# Patient Record
Sex: FEMALE | Race: WHITE | NOT HISPANIC OR LATINO | Employment: FULL TIME | URBAN - METROPOLITAN AREA
[De-identification: names, ages, dates, MRNs, and addresses within clinical notes are randomized per-mention and may not be internally consistent; named-entity substitution may affect disease eponyms.]

---

## 2017-09-28 ENCOUNTER — GENERIC CONVERSION - ENCOUNTER (OUTPATIENT)
Dept: OTHER | Facility: OTHER | Age: 62
End: 2017-09-28

## 2017-09-28 DIAGNOSIS — E78.5 HYPERLIPIDEMIA: ICD-10-CM

## 2017-09-28 DIAGNOSIS — M81.0 AGE-RELATED OSTEOPOROSIS WITHOUT CURRENT PATHOLOGICAL FRACTURE: ICD-10-CM

## 2017-09-28 DIAGNOSIS — E55.9 VITAMIN D DEFICIENCY: ICD-10-CM

## 2017-09-28 DIAGNOSIS — D72.819 DECREASED WHITE BLOOD CELL COUNT: ICD-10-CM

## 2017-09-28 DIAGNOSIS — E03.9 HYPOTHYROIDISM: ICD-10-CM

## 2017-10-05 ENCOUNTER — LAB CONVERSION - ENCOUNTER (OUTPATIENT)
Dept: OTHER | Facility: OTHER | Age: 62
End: 2017-10-05

## 2017-10-05 ENCOUNTER — GENERIC CONVERSION - ENCOUNTER (OUTPATIENT)
Dept: OTHER | Facility: OTHER | Age: 62
End: 2017-10-05

## 2017-10-05 LAB
25(OH)D3 SERPL-MCNC: 32 NG/ML (ref 30–100)
A/G RATIO (HISTORICAL): 1.8 (CALC) (ref 1–2.5)
ALBUMIN SERPL BCP-MCNC: 4.6 G/DL (ref 3.6–5.1)
ALP SERPL-CCNC: 94 U/L (ref 33–130)
ALT SERPL W P-5'-P-CCNC: 26 U/L (ref 6–29)
AST SERPL W P-5'-P-CCNC: 36 U/L (ref 10–35)
BILIRUB SERPL-MCNC: 0.6 MG/DL (ref 0.2–1.2)
BUN SERPL-MCNC: 16 MG/DL (ref 7–25)
BUN/CREA RATIO (HISTORICAL): ABNORMAL (CALC) (ref 6–22)
CALCIUM SERPL-MCNC: 9.7 MG/DL (ref 8.6–10.4)
CHLORIDE SERPL-SCNC: 105 MMOL/L (ref 98–110)
CHOLEST SERPL-MCNC: 223 MG/DL
CHOLEST/HDLC SERPL: 2.4 (CALC)
CO2 SERPL-SCNC: 28 MMOL/L (ref 20–31)
CREAT SERPL-MCNC: 0.89 MG/DL (ref 0.5–0.99)
DEPRECATED RDW RBC AUTO: 12.4 % (ref 11–15)
EGFR AFRICAN AMERICAN (HISTORICAL): 81 ML/MIN/1.73M2
EGFR-AMERICAN CALC (HISTORICAL): 70 ML/MIN/1.73M2
GAMMA GLOBULIN (HISTORICAL): 2.5 G/DL (CALC) (ref 1.9–3.7)
GLUCOSE (HISTORICAL): 95 MG/DL (ref 65–99)
HCT VFR BLD AUTO: 43 % (ref 35–45)
HDLC SERPL-MCNC: 92 MG/DL
HGB BLD-MCNC: 14.3 G/DL (ref 11.7–15.5)
LDL CHOLESTEROL (HISTORICAL): 116 MG/DL (CALC)
MCH RBC QN AUTO: 32.5 PG (ref 27–33)
MCHC RBC AUTO-ENTMCNC: 33.3 G/DL (ref 32–36)
MCV RBC AUTO: 97.7 FL (ref 80–100)
NON-HDL-CHOL (CHOL-HDL) (HISTORICAL): 131 MG/DL (CALC)
PLATELET # BLD AUTO: 181 THOUSAND/UL (ref 140–400)
PMV BLD AUTO: 12.8 FL (ref 7.5–12.5)
POTASSIUM SERPL-SCNC: 4.6 MMOL/L (ref 3.5–5.3)
RBC # BLD AUTO: 4.4 MILLION/UL (ref 3.8–5.1)
SODIUM SERPL-SCNC: 141 MMOL/L (ref 135–146)
TOTAL PROTEIN (HISTORICAL): 7.1 G/DL (ref 6.1–8.1)
TRIGL SERPL-MCNC: 54 MG/DL
TSH SERPL DL<=0.05 MIU/L-ACNC: 3.79 MIU/L (ref 0.4–4.5)
WBC # BLD AUTO: 2.8 THOUSAND/UL (ref 3.8–10.8)

## 2017-12-01 ENCOUNTER — ALLSCRIPTS OFFICE VISIT (OUTPATIENT)
Dept: OTHER | Facility: OTHER | Age: 62
End: 2017-12-01

## 2017-12-01 ENCOUNTER — GENERIC CONVERSION - ENCOUNTER (OUTPATIENT)
Dept: OTHER | Facility: OTHER | Age: 62
End: 2017-12-01

## 2017-12-01 DIAGNOSIS — D72.819 DECREASED WHITE BLOOD CELL COUNT: ICD-10-CM

## 2018-01-02 ENCOUNTER — ALLSCRIPTS OFFICE VISIT (OUTPATIENT)
Dept: OTHER | Facility: OTHER | Age: 63
End: 2018-01-02

## 2018-01-03 NOTE — PROGRESS NOTES
Assessment   1  Shingles (053 9) (B02 9)    Plan   Shingles    · Calamine External Lotion; USE AS DIRECTED ON PACKAGE   · ValACYclovir HCl - 1 GM Oral Tablet; TAKE 1 TABLET 3 TIMES DAILY    Discussion/Summary      62F with herpes zoster exacerbation likely 2/2 recent stress from divorce  Only took two dose of Valtrex  attached picture of rsh 1gm TID x 1 week lotion PRN currently having neuropathic pain, but if she does develop, can give Neurontin if not improved in 1 week  Possible side effects of new medications were reviewed with the patient/guardian today  The treatment plan was reviewed with the patient/guardian  The patient/guardian understands and agrees with the treatment plan      Chief Complaint   1  Rash    History of Present Illness   HPI: Pt noticed a rash almost a week ago on her back, but couldn't come in to be seen right away  She had been jogging a lot and noticed an itch under her sports bra and then got worse a few days ago  So, she took Valtrex that she had leftover from 1 5 years ago for a cold sore, only took 2 pills, she thinks that helped the irritating itch quite a bit  She took that 3 days ago and hasn't taken anything else OTC  itching is a lot more tolerable now  Denies any numbness or tingling   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   is going through a divorce    Rash:      Associated symptoms include pruritus, but-- no pain,-- no skin weeping,-- no fever,-- no nausea,-- no purulent drainage-- and-- no serous discharge  Review of Systems        Constitutional: no fever-- and-- no chills  Gastrointestinal: no abdominal pain,-- no nausea-- and-- no diarrhea  Musculoskeletal: no arthralgias-- and-- no myalgias  Integumentary: rash, but-- as noted in HPI-- and-- no skin lesions  Neurological: no numbness-- and-- no tingling  Active Problems   1  Hyperlipidemia (272 4) (E78 5)   2  Hypothyroidism (244 9) (E03 9)   3  Leukopenia (288 50) (D74 819)   4  Osteoporosis (733 00) (M81 0)   5  Vitamin D deficiency (268 9) (E55 9)    Past Medical History   1  History of Herpes simplex type 1 infection (054 9) (B00 9)   2  History of Squamous Cell Carcinoma Of The Skin (173 92)  Active Problems And Past Medical History Reviewed: The active problems and past medical history were reviewed and updated today  Family History   Mother    1  Family history of Colon Cancer (V16 0)  Father    2  Family history of Coronary Artery Disease (V17 49)   3  Family history of Hypertension (V17 49)   4  Family history of Type 2 Diabetes Mellitus    Social History    · Never A Smoker    Surgical History   1  History of Excision Of Lesion Face Malignant    Current Meds    1  No Reported Medications  Requested for: [id number] Recorded     The medication list was reviewed and updated today  Allergies   1  Penicillins    Vitals    Recorded: [id number] 08:36AM   Temperature 97 1 F   Heart Rate 70   Respiration 16   Systolic 867   Diastolic 75   Height 5 ft 7 in   Weight 113 lb    BMI Calculated 17 7   BSA Calculated 1 59     Physical Exam        Constitutional      General appearance: No acute distress, well appearing and well nourished  Pulmonary      Respiratory effort: No increased work of breathing or signs of respiratory distress  Auscultation of lungs: Clear to auscultation  Cardiovascular      Auscultation of heart: Normal rate and rhythm, normal S1 and S2, without murmurs  Musculoskeletal      Gait and station: Normal        Inspection/palpation of joints, bones, and muscles: Normal        Skin      Skin and subcutaneous tissue: Abnormal  -- (has a widespread area on her mid back that has scattered, erythematous, papules in various stages of healing  Multiple excoriations   No drainage  )         Signatures    Electronically signed by : LUIS EDUARDO Redd ; Jan 2 2018  9:05AM EST                       (Author)

## 2018-01-11 NOTE — RESULT NOTES
Message  Tasha Montague I have enclosed a copy of your recent labs  I will review your tests at your next visit in November  Verified Results  (1) COMPREHENSIVE METABOLIC PANEL [id number] 49:00BZ Dionne Hatch     Test Name Result Flag Reference   GLUCOSE 95 mg/dL  65-99   Fasting reference interval   UREA NITROGEN (BUN) 16 mg/dL  7-25   CREATININE 0 89 mg/dL  0 50-0 99   For patients >52years of age, the reference limit  for Creatinine is approximately 13% higher for people  identified as -American  eGFR NON-AFR  AMERICAN 70 mL/min/1 73m2  > OR = 60   eGFR AFRICAN AMERICAN 81 mL/min/1 73m2  > OR = 60   BUN/CREATININE RATIO   5-56   NOT APPLICABLE (calc)   SODIUM 141 mmol/L  135-146   POTASSIUM 4 6 mmol/L  3 5-5 3   CHLORIDE 105 mmol/L     CARBON DIOXIDE 28 mmol/L  20-31   CALCIUM 9 7 mg/dL  8 6-10 4   PROTEIN, TOTAL 7 1 g/dL  6 1-8 1   ALBUMIN 4 6 g/dL  3 6-5 1   GLOBULIN 2 5 g/dL (calc)  1 9-3 7   ALBUMIN/GLOBULIN RATIO 1 8 (calc)  1 0-2 5   BILIRUBIN, TOTAL 0 6 mg/dL  0 2-1 2   ALKALINE PHOSPHATASE 94 U/L     AST 36 U/L H 10-35   ALT 26 U/L  6-29     (1) LIPID PANEL, FASTING [id number] 08:04AM Dionne Hatch     Test Name Result Flag Reference   CHOLESTEROL, TOTAL 223 mg/dL H <200   HDL CHOLESTEROL 92 mg/dL  >11   TRIGLICERIDES 54 mg/dL  <028   LDL-CHOLESTEROL 116 mg/dL (calc) H    Reference range: <100     Desirable range <100 mg/dL for patients with CHD or  diabetes and <70 mg/dL for diabetic patients with  known heart disease  LDL-C is now calculated using the Magdi-Ochoa   calculation, which is a validated novel method providing   better accuracy than the Friedewald equation in the   estimation of LDL-C  Rk Lebron al  [facility]  3233;425(88): 2760-1042   (http://Sciences-U/faq/SSE486)   CHOL/HDLC RATIO 2 4 (calc)  <5 0   NON HDL CHOLESTEROL 131 mg/dL (calc) H <130   For patients with diabetes plus 1 major ASCVD risk   factor, treating to a non-HDL-C goal of <100 mg/dL   (LDL-C of <70 mg/dL) is considered a therapeutic   option  (Q) CBC (H/H, RBC, INDICES, WBC, PLT) [id number] 08:04ZIGGY Mala [place]     Test Name Result Flag Reference   WHITE BLOOD CELL COUNT 2 8 Thousand/uL L 3 8-10 8   RED BLOOD CELL COUNT 4 40 Million/uL  3 80-5 10   HEMOGLOBIN 14 3 g/dL  11 7-15 5   HEMATOCRIT 43 0 %  35 0-45 0   MCV 97 7 fL  80 0-100 0   MCH 32 5 pg  27 0-33 0   MCHC 33 3 g/dL  32 0-36 0   RDW 12 4 %  11 0-15 0   PLATELET COUNT 933 Thousand/uL  140-400   MPV 12 8 fL H 7 5-12 5     *(Q) VITAMIN D, 25-HYDROXY, LC/MS/MS 04Oct2017 08:04ZIGGY Mala [place]     Test Name Result Flag Reference   VITAMIN D, 25-OH, TOTAL 32 ng/mL     Vitamin D Status         25-OH Vitamin D:     Deficiency:                    <20 ng/mL  Insufficiency:             20 - 29 ng/mL  Optimal:                 > or = 30 ng/mL     For 25-OH Vitamin D testing on patients on   D2-supplementation and patients for whom quantitation   of D2 and D3 fractions is required, the QuestAssureD(TM)  25-OH VIT D, (D2,D3), LC/MS/MS is recommended: order   code 74133 (patients >2yrs)  For more information on this test, go to:  http://AdScoot/faq/MDG745  (This link is being provided for   informational/educational purposes only )     (Q) TSH, 3RD GENERATION W/REFLEX TO FT4 04Oct2017 08:04AM Mala [place]   REPORT COMMENT:  FASTING:YES     Test Name Result Flag Reference   TSH W/REFLEX TO FT4 3 79 mIU/L  0 40-4 50       Signatures   Electronically signed by : LUIS EDUARDO Ellison ; Oct  5 2017  3:36PM EST                       (Author)

## 2018-01-12 NOTE — RESULT NOTES
Message  Yovany Hernandez I have enclosed a copy of your recent labs  mildly elevated cholesterol at 228 < 200 normal  mild decrease in white cell count at 3,100  no change from August 2015  mildly elevated TSH  this indicates a mildly underactive thyroid  please schedule a follow up visit to review  Results/Data     (1) LIPID PANEL, FASTING   CHOLESTEROL, TOTAL: 228 mg/dL Abnormal High Reference Range 125-200  HDL CHOLESTEROL: 90 mg/dL Reference Range > OR = 46  TRIGLICERIDES: 69 mg/dL Reference Range <150  LDL-CHOLESTEROL: 124 mg/dL (calc) Reference Range <130  CHOL/HDLC RATIO: 2 5 (calc) Reference Range < OR = 5 0  NON HDL CHOLESTEROL: 138 mg/dL (calc)  (1) COMPREHENSIVE METABOLIC PANEL   GLUCOSE: 89 mg/dL Reference Range 65-99  UREA NITROGEN (BUN): 13 mg/dL Reference Range 7-25  CREATININE: 0 96 mg/dL Reference Range 0 50-0 99  eGFR NON-AFR   AMERICAN: 64 mL/min/1 73m2 Reference Range > OR = 60  eGFR : 75 mL/min/1 73m2 Reference Range > OR = 60  BUN/CREATININE RATIO: NOT APPLICABLE (calc) Reference Range 6-22  SODIUM: 140 mmol/L Reference Range 135-146  POTASSIUM: 4 6 mmol/L Reference Range 3 5-5 3  CHLORIDE: 104 mmol/L Reference Range   CARBON DIOXIDE: 30 mmol/L Reference Range 20-31  CALCIUM: 9 9 mg/dL Reference Range 8 6-10 4  PROTEIN, TOTAL: 7 2 g/dL Reference Range 6 1-8 1  ALBUMIN: 4 7 g/dL Reference Range 3 6-5 1  GLOBULIN: 2 5 g/dL (calc) Reference Range 1 9-3 7  ALBUMIN/GLOBULIN RATIO: 1 9 (calc) Reference Range 1 0-2 5  BILIRUBIN, TOTAL: 0 7 mg/dL Reference Range 0 2-1 2  ALKALINE PHOSPHATASE: 83 U/L Reference Range   AST: 22 U/L Reference Range 10-35  ALT: 18 U/L Reference Range 6-29  (1) CBC/PLT/DIFF   WHITE BLOOD CELL COUNT: 3 1 Thousand/uL Abnormal Low Reference Range  3 8-10 8  RED BLOOD CELL COUNT: 4 47 Million/uL Reference Range 3 80-5 10  HEMOGLOBIN: 14 8 g/dL Reference Range 11 7-15 5  HEMATOCRIT: 43 7 % Reference Range 35 0-45 0  MCV: 97 8 fL Reference Range 80 0-100 0  MCH: 33 0 pg Reference Range 27 0-33 0  MCHC: 33 7 g/dL Reference Range 32 0-36 0  RDW: 13 3 % Reference Range 11 0-15 0  PLATELET COUNT: 037 Thousand/uL Reference Range 140-400  MPV: 11 2 fL Reference Range 7 5-11 5  ABSOLUTE NEUTROPHILS: 1373 cells/uL Abnormal Low Reference Range 2109-4880  ABSOLUTE LYMPHOCYTES: 1429 cells/uL Reference Range 850-3900  ABSOLUTE MONOCYTES: 236 cells/uL Reference Range 200-950  ABSOLUTE EOSINOPHILS: 34 cells/uL Reference Range   ABSOLUTE BASOPHILS: 28 cells/uL Reference Range 0-200  NEUTROPHILS: 44 3 %  LYMPHOCYTES: 46 1 %  MONOCYTES: 7 6 %  EOSINOPHILS: 1 1 %  BASOPHILS: 0 9 %  (Q) TSH, 3RD GENERATION   TSH: 5 35 mIU/L Abnormal High Reference Range 0 40-4 50  *(Q) VITAMIN D, 25-HYDROXY, LC/MS/MS   VITAMIN D, 25-OH, TOTAL: 49 ng/mL Reference Range     Signatures   Electronically signed by : LUIS EDUARDO Varner ; Aug 17 2016 12:53PM EST                       (Author)

## 2018-01-14 NOTE — RESULT NOTES
Message  Yovany Hernandez I have enclosed a copy of your recent labs  repeat thyroid studies normal  your white count remains low normal at 3,800 normal range 3,100 to 10,080  Your last 2 white cell counts were 3,100  Tests for folic acid and vitamin B 12 normal  I would recommend a repeat CBC with white cell count one year        Results/Data     (1) CBC/PLT/DIFF   WHITE BLOOD CELL COUNT: 3 4 Thousand/uL Abnormal Low Reference Range  3 8-10 8  RED BLOOD CELL COUNT: 4 07 Million/uL Reference Range 3 80-5 10  HEMOGLOBIN: 13 1 g/dL Reference Range 11 7-15 5  HEMATOCRIT: 38 8 % Reference Range 35 0-45 0  MCV: 95 4 fL Reference Range 80 0-100 0  MCH: 32 2 pg Reference Range 27 0-33 0  MCHC: 33 8 g/dL Reference Range 32 0-36 0  RDW: 13 4 % Reference Range 11 0-15 0  PLATELET COUNT: 787 Thousand/uL Reference Range 140-400  MPV: 11 1 fL Reference Range 7 5-11 5  ABSOLUTE NEUTROPHILS: 2026 cells/uL Reference Range 4232-0019  ABSOLUTE LYMPHOCYTES: 1091 cells/uL Reference Range 850-3900  ABSOLUTE MONOCYTES: 241 cells/uL Reference Range 200-950  ABSOLUTE EOSINOPHILS: 17 cells/uL Reference Range   ABSOLUTE BASOPHILS: 24 cells/uL Reference Range 0-200  NEUTROPHILS: 59 6 %  LYMPHOCYTES: 32 1 %  MONOCYTES: 7 1 %  EOSINOPHILS: 0 5 %  BASOPHILS: 0 7 %  (Q) THYROID PEROXIDASE ANTIBODIES   THYROID PEROXIDASE$ANTIBODIES: 1 IU/mL Reference Range <9  (1) FOLATE   FOLATE, SERUM: 15 8 ng/mL  (1) VITAMIN B12   VITAMIN B12: 443 pg/mL Reference Range 200-1100  (Q) TSH, 3RD GENERATION W/REFLEX TO FT4   TSH W/REFLEX TO FT4: 2 47 mIU/L Reference Range 0 40-4 50    Signatures   Electronically signed by : LUIS EDUARDO Dumont ; Nov 29 2016  4:22PM EST                       (Author)

## 2018-01-18 NOTE — PROGRESS NOTES
Assessment    1  Encounter for preventive health examination (V70 0) (Z00 00)    Plan  Hypothyroidism, Leukopenia    · (1) CBC/PLT/DIFF; Status:Active; Requested for:10Nov2016;    · (1) FOLATE; Status:Active; Requested for:10Nov2016;    · (1) THYROID MICROSOMAL ANTIBODY; Status:Active; Requested for:10Nov2016;    · (1) TSH WITH FT4 REFLEX; Status:Active; Requested for:10Nov2016;    · (1) VITAMIN B12; Status:Active; Requested for:10Nov2016;   Need for shingles vaccine    · Zostavax 62113 UNT/0 65ML Subcutaneous Solution Reconstituted (Zostavax  56933 UNT/0 65ML Subcutaneous Solution Reconstituted); INJECT 0 65 ML Once    Discussion/Summary  health maintenance visit Currently, she eats a healthy diet and has an adequate exercise regimen  cervical cancer screening is current Breast cancer screening: mammogram is current  Colorectal cancer screening: colorectal cancer screening is current  Osteoporosis screening: bone mineral density testing is current  Screening lab work includes glucose  The risks and benefits of immunizations were discussed, immunizations will be given as outlined in the orders and script for shingles  Advice and education were given regarding nutrition, calcium supplements and vitamin D supplements  Repeat CBC and thyroid studies this month  obtain copy of DEXA scan  History of Present Illness  HM, Adult Female: The patient is being seen for a health maintenance evaluation  The last health maintenance visit was >1 year(s) ago  Social History: Household members include spouse  Work status: occupation: retired  The patient has never smoked cigarettes  She reports occasional alcohol use  General Health: The patient's health since the last visit is described as good  She has regular dental visits  She denies vision problems  Lifestyle:  She consumes a diverse and healthy diet  She does not have any weight concerns  She exercises regularly  She exercises 3 or more times per week  Exercise includes walking, aerobic conditioning and strength training  She does not use tobacco    Reproductive health: the patient is postmenopausal    Screening: Cervical cancer screening includes a pap smear performed < 1 year ago  Breast cancer screening includes a mammogram performed 2016  Colorectal cancer screening includes a colonoscopy performed 09/2013  Metabolic screening includes lipid profile performed 08/2016, glucose screening performed 08/2016 and thyroid function test performed 08/2016  General health risks: osteoporosis risk factors  HPI: 64year old female here for wellness exam  last seen 06/2015  she splits her time between [place] and [facility]  her daughter lives in [facility]  active problems and PMH see chart  medications none  OTCs none she stopped taking all supplements  last GYN and mammogram < 1 year  colonoscopy 09/2013  labs 08/2016 see note  Hyperlipidemia with high HDL  Cholesterol 228  Triglycerides 69  HDL 90    Non-  FBS 89  Review of Systems    Constitutional: no recent weight gain and no recent weight loss  Eyes: no eyesight problems  Cardiovascular: no chest pain, no palpitations and no lower extremity edema  Respiratory: no cough, no orthopnea, no wheezing, no shortness of breath during exertion and no PND  Gastrointestinal: no abdominal pain, no nausea, no constipation, no diarrhea and no blood in stools  Genitourinary: no dysuria and no incontinence  Musculoskeletal: no arthralgias and no myalgias  Integumentary: no rashes and no skin lesions  Neurological: no headache and no dizziness  Psychiatric: no anxiety and no depression  Endocrine: history of osteoporosis last DEXA scan < 2 years in [place]  results not available  vitamin D level 08/2016 49  subclinical hypothyroidism  08/2016 TSH 53 90  08/2015  TSH 3 930  05/2012 TSH 4 40  thyroid peroxidase AB negative     Hematologic/Lymphatic: history of mild leukopenia  08/2016 CBC WBC 3,100  Hgb 14  8  platelets 213,386  53/8117 CBC WBC 3,100  Hgb 13  4  platelets 131,214  35/2527 WBC 4,000  Hgb 13  4  platelets 469,349  73/1449 vitamin B 12 1,093    folic acid 20 3  Active Problems    1  Hyperlipidemia (272 4) (E78 5)   2  Hypothyroidism (244 9) (E03 9)   3  Leukopenia (288 50) (D72 819)   4  Osteoporosis (733 00) (M81 0)   5  Vitamin D deficiency (268 9) (E55 9)    Past Medical History    · History of Herpes simplex type 1 infection (054 9) (B00 9)   · History of Squamous Cell Carcinoma Of The Skin (173 92)    Surgical History    · History of Excision Of Lesion Face Malignant    Family History  Mother    · Family history of Colon Cancer (V16 0)  Father    · Family history of Coronary Artery Disease (V17 49)   · Family history of Hypertension (V17 49)   · Family history of Type 2 Diabetes Mellitus    Social History    · Never A Smoker    Current Meds   1  ValACYclovir HCl - 1 GM Oral Tablet; 2 tablets twice a day for one day; Therapy: [id number] to (Last Rx:[id number])  Requested for: 03Tmt8514 Ordered    Allergies    1  Penicillins    Vitals   Recorded: 11HOA6481 88:98AX   Systolic 519   Diastolic 76   Heart Rate 74   Respiration 14   Temperature 97 3 F   Height 5 ft 7 in   Weight 111 lb 0 48 oz   BMI Calculated 17 39   BSA Calculated 1 57     Physical Exam    Constitutional   General appearance: No acute distress, well appearing and well nourished  Eyes   Conjunctiva and lids: No swelling, erythema or discharge  Pupils and irises: Equal, round, reactive to light  Ophthalmoscopic examination: Normal fundi and optic discs  Ears, Nose, Mouth, and Throat   Otoscopic examination: Tympanic membranes translucent with normal light reflex  Canals patent without erythema  Lips, teeth, and gums: Normal, good dentition  Oropharynx: Normal with no erythema, edema, exudate or lesions  Neck   Neck: Supple, symmetric, trachea midline, no masses  Thyroid: Normal, no thyromegaly    There were no thyroid nodules  Pulmonary   Auscultation of lungs: Clear to auscultation  Cardiovascular   Auscultation of heart: Normal rate and rhythm, normal S1 and S2, no murmurs  Heart sounds: no gallop heard  Carotid pulses: 2+ bilaterally  no bruit heard over the right carotid and no bruit heard over the left carotid  Examination of extremities for edema and/or varicosities: Normal     Abdomen   Abdomen: Non-tender, no masses  Liver and spleen: No hepatomegaly or splenomegaly  Lymphatic   Palpation of lymph nodes in neck: No lymphadenopathy  no anterior cervical node enlargement, no posterior cervical node enlargement, no submandibular node enlargement and no supraclavicular node enlargement  Musculoskeletal   Gait and station: Normal     Skin   Skin and subcutaneous tissue: Normal without rashes or lesions  Psychiatric   Mood and affect: Normal        Future Appointments    Date/Time Provider Specialty Site   11/13/2017 08:00 AM LUIS EDUARDO Garcia   Family Medicine [id number]     Signatures   Electronically signed by : LUIS EDUARDO Chaidez ; Nov 10 2016 10:36AM EST                       (Author)

## 2018-01-22 VITALS
SYSTOLIC BLOOD PRESSURE: 108 MMHG | BODY MASS INDEX: 17.11 KG/M2 | HEIGHT: 67 IN | RESPIRATION RATE: 16 BRPM | HEART RATE: 68 BPM | TEMPERATURE: 96.1 F | DIASTOLIC BLOOD PRESSURE: 66 MMHG | WEIGHT: 109 LBS

## 2018-01-22 VITALS
TEMPERATURE: 96.1 F | SYSTOLIC BLOOD PRESSURE: 108 MMHG | HEART RATE: 68 BPM | HEIGHT: 67 IN | WEIGHT: 109 LBS | BODY MASS INDEX: 17.11 KG/M2 | DIASTOLIC BLOOD PRESSURE: 66 MMHG | RESPIRATION RATE: 16 BRPM

## 2018-01-23 VITALS
HEART RATE: 70 BPM | SYSTOLIC BLOOD PRESSURE: 110 MMHG | TEMPERATURE: 97.1 F | RESPIRATION RATE: 16 BRPM | HEIGHT: 67 IN | DIASTOLIC BLOOD PRESSURE: 75 MMHG | WEIGHT: 113 LBS | BODY MASS INDEX: 17.74 KG/M2

## 2018-01-23 NOTE — PROGRESS NOTES
Assessment    1  Encounter for preventive health examination (V70 0) (Z00 00)    Plan  Leukopenia    · (1) CBC/PLT/DIFF; Status:Active; Requested for:[id number];     Discussion/Summary  Breast cancer screening: the next mammogram is due 2017  Regarding leukopenia  I recommended a repeat CBC in 6 month alternatively could consider hematology evaluation  She is due for a follow-up DEXA scan  She is not interested in a flu vaccine  Office visit 1 year  health maintenance visit Currently, she eats a healthy diet and has an adequate exercise regimen  cervical cancer screening is current Breast cancer screening: mammogram is current  Colorectal cancer screening: colorectal cancer screening is current  Osteoporosis screening: bone mineral density testing is current  The risks and benefits of immunizations were discussed and patient declines immunizations  Advice and education were given regarding nutrition, calcium supplements, vitamin D supplements and cardiovascular risk reduction  History of Present Illness  HM, Adult Female: The patient is being seen for a health maintenance evaluation  The last health maintenance visit was 1 year(s) ago  Social History: Household members include spouse  She is   Work status: working part-time and occupation:   The patient has never smoked cigarettes  She reports occasional alcohol use  General Health: The patient's health since the last visit is described as good  She has regular dental visits  She denies vision problems  She denies hearing loss  Lifestyle:  She consumes a diverse and healthy diet  She does not have any weight concerns  She exercises regularly  She exercises 3 or more times per week  Exercise includes walking, aerobic conditioning and strength training  She does not use tobacco    Reproductive health: the patient is postmenopausal    Screening: Cervical cancer screening includes a pap smear performed 2016   Breast cancer screening includes a mammogram performed 2016  Colorectal cancer screening includes a colonoscopy performed 09/2013  Metabolic screening includes lipid profile performed 11/2017, glucose screening performed 11/2017, thyroid function test performed 11/2017 and DEXA performed within the past five years  General health risks: osteoporosis risk factors  HPI: 58year old female here for wellness exam  last seen 11/2016 she splits her time between [place] and [facility]  her daughter lives in [place]  active problems and PMH see chart  medications none  OTCs vitamin D  labs 11/2017 see note  Hyperlipidemia with high HDL  Cholesterol 223  Triglycerides 54  HDL 92    Non   FBS 95  TSH 3 79  Review of Systems    Constitutional: no recent weight gain and no recent weight loss  Eyes: no eyesight problems  ENT: no earache, no sore throat, no nasal discharge and no hoarseness  Cardiovascular: no chest pain, no palpitations and no lower extremity edema  Respiratory: no cough, no orthopnea, no wheezing, no shortness of breath during exertion and no PND  Gastrointestinal: no abdominal pain, no nausea, no constipation, no diarrhea and no blood in stools  Genitourinary: no dysuria and no incontinence  Musculoskeletal: no arthralgias and no myalgias  Integumentary: no rashes and no skin lesions  Neurological: no headache and no dizziness  Psychiatric: no anxiety and no depression  Endocrine: history of osteoporosis last DEXA scan < 2 years in [place]  results not available  vitamin D level 11/2017 32  subclinical hypothyroidism  11/2017 TSH 3 79  08/2016 TSH 53 90  08/2015  TSH 3 930  05/2012 TSH 4 40  thyroid peroxidase AB negative  Hematologic/Lymphatic: history of mild leukopenia  11/2017 CBC WBC 2,800  Hgb 14 3  MCV 97 7  platelets 580,649  32/7039 folate 15  7  vitamin B 12 443  08/2016 CBC WBC 3,100  Hgb 14  8  platelets 838,628  50/6575 CBC WBC 3,100  Hgb 13  4   platelets 168,000  09/2013 WBC 4,000  Hgb 13  4  platelets 470,427  77/5035 vitamin B 12 1,093    folic acid 46 1  Active Problems    1  Hyperlipidemia (272 4) (E78 5)   2  Hypothyroidism (244 9) (E03 9)   3  Leukopenia (288 50) (D72 819)   4  Osteoporosis (733 00) (M81 0)   5  Vitamin D deficiency (268 9) (E55 9)    Past Medical History    · History of Herpes simplex type 1 infection (054 9) (B00 9)   · History of Squamous Cell Carcinoma Of The Skin (173 92)    Surgical History    · History of Excision Of Lesion Face Malignant    Family History  Mother    · Family history of Colon Cancer (V16 0)  Father    · Family history of Coronary Artery Disease (V17 49)   · Family history of Hypertension (V17 49)   · Family history of Type 2 Diabetes Mellitus    Social History    · Never A Smoker    Current Meds   1  No Reported Medications  Requested for: [id number] Recorded    Allergies    1  Penicillins    Vitals   Recorded: [id number] 08:47AM   Temperature 96 1 F   Heart Rate 68   Respiration 16   Systolic 734   Diastolic 66   Height 5 ft 7 in   Weight 109 lb    BMI Calculated 17 07   BSA Calculated 1 56     Physical Exam    Constitutional   General appearance: No acute distress, well appearing and well nourished  Eyes   Conjunctiva and lids: No swelling, erythema or discharge  Pupils and irises: Equal, round, reactive to light  Ophthalmoscopic examination: Normal fundi and optic discs  Ears, Nose, Mouth, and Throat   Otoscopic examination: Tympanic membranes translucent with normal light reflex  Canals patent without erythema  Lips, teeth, and gums: Normal, good dentition  Oropharynx: Normal with no erythema, edema, exudate or lesions  Neck   Neck: Supple, symmetric, trachea midline, no masses  Thyroid: Normal, no thyromegaly  There were no thyroid nodules  Pulmonary   Auscultation of lungs: Clear to auscultation      Cardiovascular   Auscultation of heart: Normal rate and rhythm, normal S1 and S2, no murmurs  Heart sounds: no gallop heard  Carotid pulses: 2+ bilaterally  no bruit heard over the right carotid and no bruit heard over the left carotid  Examination of extremities for edema and/or varicosities: Normal     Abdomen   Abdomen: Non-tender, no masses  Liver and spleen: No hepatomegaly or splenomegaly  Lymphatic   Palpation of lymph nodes in neck: No lymphadenopathy  no anterior cervical node enlargement, no posterior cervical node enlargement, no submandibular node enlargement and no supraclavicular node enlargement  Musculoskeletal   Gait and station: Normal     Digits and nails: Normal without clubbing or cyanosis  Skin   Skin and subcutaneous tissue: Normal without rashes or lesions  Psychiatric   Mood and affect: Normal        Results/Data  (1) COMPREHENSIVE METABOLIC PANEL [id number] 05:26TG [employer]     Test Name Result Flag Reference   GLUCOSE 95 mg/dL  65-99   Fasting reference interval   UREA NITROGEN (BUN) 16 mg/dL  7-25   CREATININE 0 89 mg/dL  0 50-0 99   For patients >52years of age, the reference limit  for Creatinine is approximately 13% higher for people  identified as -American  eGFR NON-AFR   AMERICAN 70 mL/min/1 73m2  > OR = 60   eGFR AFRICAN AMERICAN 81 mL/min/1 73m2  > OR = 60   BUN/CREATININE RATIO   0-12   NOT APPLICABLE (calc)   SODIUM 141 mmol/L  135-146   POTASSIUM 4 6 mmol/L  3 5-5 3   CHLORIDE 105 mmol/L     CARBON DIOXIDE 28 mmol/L  20-31   CALCIUM 9 7 mg/dL  8 6-10 4   PROTEIN, TOTAL 7 1 g/dL  6 1-8 1   ALBUMIN 4 6 g/dL  3 6-5 1   GLOBULIN 2 5 g/dL (calc)  1 9-3 7   ALBUMIN/GLOBULIN RATIO 1 8 (calc)  1 0-2 5   BILIRUBIN, TOTAL 0 6 mg/dL  0 2-1 2   ALKALINE PHOSPHATASE 94 U/L     AST 36 U/L H 10-35   ALT 26 U/L  6-29     (1) LIPID PANEL, FASTING [id number] 08:04AM [employer]     Test Name Result Flag Reference   CHOLESTEROL, TOTAL 223 mg/dL H <200   HDL CHOLESTEROL 92 mg/dL  >86   TRIGLICERIDES 54 mg/dL  <098   LDL-CHOLESTEROL 116 mg/dL (calc) H    Reference range: <100     Desirable range <100 mg/dL for patients with CHD or  diabetes and <70 mg/dL for diabetic patients with  known heart disease  LDL-C is now calculated using the Magdi-Ochoa   calculation, which is a validated novel method providing   better accuracy than the Friedewald equation in the   estimation of LDL-C  Dustin Winters  Iwona Ahuja  6529;194(15): 8603-1459   (http://[employer]/faq/RRP279)   CHOL/HDLC RATIO 2 4 (calc)  <5 0   NON HDL CHOLESTEROL 131 mg/dL (calc) H <130   For patients with diabetes plus 1 major ASCVD risk   factor, treating to a non-HDL-C goal of <100 mg/dL   (LDL-C of <70 mg/dL) is considered a therapeutic   option  (Q) CBC (H/H, RBC, INDICES, WBC, PLT) [id number] 08:04AM [employer]     Test Name Result Flag Reference   WHITE BLOOD CELL COUNT 2 8 Thousand/uL L 3 8-10 8   RED BLOOD CELL COUNT 4 40 Million/uL  3 80-5 10   HEMOGLOBIN 14 3 g/dL  11 7-15 5   HEMATOCRIT 43 0 %  35 0-45 0   MCV 97 7 fL  80 0-100 0   MCH 32 5 pg  27 0-33 0   MCHC 33 3 g/dL  32 0-36 0   RDW 12 4 %  11 0-15 0   PLATELET COUNT 711 Thousand/uL  140-400   MPV 12 8 fL H 7 5-12 5     *(Q) VITAMIN D, 25-HYDROXY, LC/MS/MS [id number] 08:04AM [employer]     Test Name Result Flag Reference   VITAMIN D, 25-OH, TOTAL 32 ng/mL     Vitamin D Status         25-OH Vitamin D:     Deficiency:                    <20 ng/mL  Insufficiency:             20 - 29 ng/mL  Optimal:                 > or = 30 ng/mL     For 25-OH Vitamin D testing on patients on   D2-supplementation and patients for whom quantitation   of D2 and D3 fractions is required, the QuestAssureD(TM)  25-OH VIT D, (D2,D3), LC/MS/MS is recommended: order   code 02199 (patients >2yrs)  For more information on this test, go to:  http://[employer]/faq/BNQ264  (This link is being provided for   informational/educational purposes only )     (Q) TSH, 3RD GENERATION W/REFLEX TO FT4 [id number] 08:04AM Rachid Martinez   REPORT COMMENT:  FASTING:YES     Test Name Result Flag Reference   TSH W/REFLEX TO FT4 3 79 mIU/L  0 40-4 50     (1) CBC/PLT/DIFF [id number] 10:02AM [facility] Martinez     Test Name Result Flag Reference   WHITE BLOOD CELL COUNT 3 4 Thousand/uL L 3 8-10 8   RED BLOOD CELL COUNT 4 07 Million/uL  3 80-5 10   HEMOGLOBIN 13 1 g/dL  11 7-15 5   HEMATOCRIT 38 8 %  35 0-45 0   MCV 95 4 fL  80 0-100 0   MCH 32 2 pg  27 0-33 0   MCHC 33 8 g/dL  32 0-36 0   RDW 13 4 %  11 0-15 0   PLATELET COUNT 875 Thousand/uL  140-400   ABSOLUTE NEUTROPHILS 2026 cells/uL  3856-8280   ABSOLUTE LYMPHOCYTES 1091 cells/uL  850-3900   ABSOLUTE MONOCYTES 241 cells/uL  200-950   ABSOLUTE EOSINOPHILS 17 cells/uL     ABSOLUTE BASOPHILS 24 cells/uL  0-200   NEUTROPHILS 59 6 %     LYMPHOCYTES 32 1 %     MONOCYTES 7 1 %     EOSINOPHILS 0 5 %     BASOPHILS 0 7 %     MPV 11 1 fL  7 5-11 5     (1) FOLATE [id number] 10:02AM [facility] [employer]     Test Name Result Flag Reference   FOLATE, SERUM 15 8 ng/mL     Reference Range                             Low:           <3 4                             Borderline:    3 4-5 4                             Normal:        >5 4     (1) VITAMIN B12 [id number] 10:02AM [facility] [employer]     Test Name Result Flag Reference   VITAMIN B12 443 pg/mL  200-1100     (Q) THYROID PEROXIDASE ANTIBODIES [id number] 10:02AM [facility] [employer]     Test Name Result Flag Reference   THYROID PEROXIDASE$ANTIBODIES 1 IU/mL  <9       Signatures   Electronically signed by : LUIS EDUARDO Gomes ; Dec  1 2017  1:50PM EST                       (Author)

## 2018-07-23 LAB
25(OH)D3 SERPL-MCNC: 36 NG/ML (ref 30–100)
ALBUMIN SERPL-MCNC: 4.4 G/DL (ref 3.6–5.1)
ALBUMIN/GLOB SERPL: 1.8 (CALC) (ref 1–2.5)
ALP SERPL-CCNC: 75 U/L (ref 33–130)
ALT SERPL-CCNC: 16 U/L (ref 6–29)
AST SERPL-CCNC: 20 U/L (ref 10–35)
BASOPHILS # BLD AUTO: 78 CELLS/UL (ref 0–200)
BASOPHILS NFR BLD AUTO: 2.9 %
BILIRUB SERPL-MCNC: 0.5 MG/DL (ref 0.2–1.2)
BUN SERPL-MCNC: 16 MG/DL (ref 7–25)
BUN/CREAT SERPL: NORMAL (CALC) (ref 6–22)
CALCIUM SERPL-MCNC: 9.7 MG/DL (ref 8.6–10.4)
CHLORIDE SERPL-SCNC: 104 MMOL/L (ref 98–110)
CHOLEST SERPL-MCNC: 219 MG/DL
CHOLEST/HDLC SERPL: 2.5 (CALC)
CO2 SERPL-SCNC: 30 MMOL/L (ref 20–31)
CREAT SERPL-MCNC: 0.86 MG/DL (ref 0.5–0.99)
EOSINOPHIL # BLD AUTO: 216 CELLS/UL (ref 15–500)
EOSINOPHIL NFR BLD AUTO: 8 %
ERYTHROCYTE [DISTWIDTH] IN BLOOD BY AUTOMATED COUNT: 12.4 % (ref 11–15)
GLOBULIN SER CALC-MCNC: 2.5 G/DL (CALC) (ref 1.9–3.7)
GLUCOSE SERPL-MCNC: 92 MG/DL (ref 65–99)
HCT VFR BLD AUTO: 41.6 % (ref 35–45)
HDLC SERPL-MCNC: 86 MG/DL
HGB BLD-MCNC: 14 G/DL (ref 11.7–15.5)
LDLC SERPL CALC-MCNC: 119 MG/DL (CALC)
LYMPHOCYTES # BLD AUTO: 1183 CELLS/UL (ref 850–3900)
LYMPHOCYTES NFR BLD AUTO: 43.8 %
MCH RBC QN AUTO: 33.1 PG (ref 27–33)
MCHC RBC AUTO-ENTMCNC: 33.7 G/DL (ref 32–36)
MCV RBC AUTO: 98.3 FL (ref 80–100)
MONOCYTES # BLD AUTO: 238 CELLS/UL (ref 200–950)
MONOCYTES NFR BLD AUTO: 8.8 %
NEUTROPHILS # BLD AUTO: 986 CELLS/UL (ref 1500–7800)
NEUTROPHILS NFR BLD AUTO: 36.5 %
NONHDLC SERPL-MCNC: 133 MG/DL (CALC)
PLATELET # BLD AUTO: 194 THOUSAND/UL (ref 140–400)
PMV BLD REES-ECKER: 12 FL (ref 7.5–12.5)
POTASSIUM SERPL-SCNC: 4.4 MMOL/L (ref 3.5–5.3)
PROT SERPL-MCNC: 6.9 G/DL (ref 6.1–8.1)
RBC # BLD AUTO: 4.23 MILLION/UL (ref 3.8–5.1)
SL AMB EGFR AFRICAN AMERICAN: 84 ML/MIN/1.73M2
SL AMB EGFR NON AFRICAN AMERICAN: 72 ML/MIN/1.73M2
SODIUM SERPL-SCNC: 140 MMOL/L (ref 135–146)
TRIGL SERPL-MCNC: 51 MG/DL
TSH SERPL-ACNC: 4.48 MIU/L (ref 0.4–4.5)
WBC # BLD AUTO: 2.7 THOUSAND/UL (ref 3.8–10.8)

## 2018-11-12 ENCOUNTER — OFFICE VISIT (OUTPATIENT)
Dept: FAMILY MEDICINE CLINIC | Facility: CLINIC | Age: 63
End: 2018-11-12
Payer: COMMERCIAL

## 2018-11-12 VITALS
WEIGHT: 111 LBS | SYSTOLIC BLOOD PRESSURE: 104 MMHG | HEIGHT: 68 IN | DIASTOLIC BLOOD PRESSURE: 60 MMHG | HEART RATE: 76 BPM | RESPIRATION RATE: 16 BRPM | BODY MASS INDEX: 16.82 KG/M2 | TEMPERATURE: 98.5 F

## 2018-11-12 DIAGNOSIS — M70.50 PES ANSERINE BURSITIS: Primary | ICD-10-CM

## 2018-11-12 PROCEDURE — 3008F BODY MASS INDEX DOCD: CPT | Performed by: FAMILY MEDICINE

## 2018-11-12 PROCEDURE — 1036F TOBACCO NON-USER: CPT | Performed by: FAMILY MEDICINE

## 2018-11-12 PROCEDURE — 99213 OFFICE O/P EST LOW 20 MIN: CPT | Performed by: FAMILY MEDICINE

## 2018-11-12 RX ORDER — MAGNESIUM 200 MG
1 TABLET ORAL DAILY
COMMUNITY

## 2018-11-12 NOTE — PROGRESS NOTES
Assessment/Plan:         Diagnoses and all orders for this visit:    Pes anserine bursitis  -     Ambulatory referral to Physical Therapy; Future    Other orders  -     Cyanocobalamin (VITAMIN B-12) 1000 MCG SUBL; Place 1 tablet under the tongue daily  -     FOLIC ACID PO; Take 1 tablet by mouth daily        Ice  Prn NSAIDs  Referral for PT  She is not interested in a steroid injection  Recheck prn  Patient ID: Diogo Al is a 61 y o  female  Patient presents with a several week history of intermittent pain left knee  no trauma or injury  No swelling  No giving way or locking   flying internationally  physically active  She had mild swelling of her left ankle  this resolved using a compression sleeve  No history of DVTs        The following portions of the patient's history were reviewed and updated as appropriate: allergies, current medications, past family history, past medical history, past social history, past surgical history and problem list     Review of Systems   Constitutional: Negative for chills and fever  Respiratory: Negative for cough, shortness of breath and wheezing  Cardiovascular: Negative for chest pain and palpitations  Musculoskeletal: Negative for joint swelling  See HPI   Skin: Negative for rash  Objective:      /60   Pulse 76   Temp 98 5 °F (36 9 °C)   Resp 16   Ht 5' 7 5" (1 715 m)   Wt 50 3 kg (111 lb)   BMI 17 13 kg/m²          Physical Exam   Constitutional: No distress  Cardiovascular:   Pulses:       Dorsalis pedis pulses are 2+ on the right side, and 2+ on the left side  Posterior tibial pulses are 2+ on the right side, and 2+ on the left side  Musculoskeletal: She exhibits no edema          Left knee: She exhibits normal range of motion, no swelling, no effusion, no ecchymosis, no deformity, no erythema, normal alignment, no LCL laxity, normal patellar mobility, no bony tenderness, normal meniscus and no MCL laxity  Tenderness (mild tenderness left pes anserine bursa  ) found  No medial joint line, no lateral joint line, no MCL, no LCL and no patellar tendon tenderness noted  No left calf swelling  No left calf tenderness or Homans sign

## 2019-08-14 ENCOUNTER — TELEPHONE (OUTPATIENT)
Dept: FAMILY MEDICINE CLINIC | Facility: CLINIC | Age: 64
End: 2019-08-14

## 2019-08-14 DIAGNOSIS — E55.9 VITAMIN D DEFICIENCY: ICD-10-CM

## 2019-08-14 DIAGNOSIS — D72.819 LEUKOPENIA, UNSPECIFIED TYPE: ICD-10-CM

## 2019-08-14 DIAGNOSIS — E78.00 PURE HYPERCHOLESTEROLEMIA: Primary | ICD-10-CM

## 2019-08-14 NOTE — TELEPHONE ENCOUNTER
Patient is requesting scripts for lab work before she schedules a yearly wellness visit  If done today, she will  otherwise we should mail

## 2019-08-18 LAB
25(OH)D3 SERPL-MCNC: 29 NG/ML (ref 30–100)
ALBUMIN SERPL-MCNC: 4.4 G/DL (ref 3.6–5.1)
ALBUMIN/GLOB SERPL: 1.7 (CALC) (ref 1–2.5)
ALP SERPL-CCNC: 95 U/L (ref 33–130)
ALT SERPL-CCNC: 15 U/L (ref 6–29)
AST SERPL-CCNC: 21 U/L (ref 10–35)
BASOPHILS # BLD AUTO: 41 CELLS/UL (ref 0–200)
BASOPHILS NFR BLD AUTO: 1.8 %
BILIRUB SERPL-MCNC: 0.5 MG/DL (ref 0.2–1.2)
BUN SERPL-MCNC: 13 MG/DL (ref 7–25)
BUN/CREAT SERPL: NORMAL (CALC) (ref 6–22)
CALCIUM SERPL-MCNC: 9.8 MG/DL (ref 8.6–10.4)
CHLORIDE SERPL-SCNC: 102 MMOL/L (ref 98–110)
CHOLEST SERPL-MCNC: 220 MG/DL
CHOLEST/HDLC SERPL: 2.6 (CALC)
CO2 SERPL-SCNC: 28 MMOL/L (ref 20–32)
CREAT SERPL-MCNC: 0.88 MG/DL (ref 0.5–0.99)
EOSINOPHIL # BLD AUTO: 21 CELLS/UL (ref 15–500)
EOSINOPHIL NFR BLD AUTO: 0.9 %
ERYTHROCYTE [DISTWIDTH] IN BLOOD BY AUTOMATED COUNT: 12.5 % (ref 11–15)
GLOBULIN SER CALC-MCNC: 2.6 G/DL (CALC) (ref 1.9–3.7)
GLUCOSE SERPL-MCNC: 92 MG/DL (ref 65–99)
HCT VFR BLD AUTO: 42.4 % (ref 35–45)
HDLC SERPL-MCNC: 84 MG/DL
HGB BLD-MCNC: 14.3 G/DL (ref 11.7–15.5)
LDLC SERPL CALC-MCNC: 123 MG/DL (CALC)
LYMPHOCYTES # BLD AUTO: 881 CELLS/UL (ref 850–3900)
LYMPHOCYTES NFR BLD AUTO: 38.3 %
MCH RBC QN AUTO: 32.2 PG (ref 27–33)
MCHC RBC AUTO-ENTMCNC: 33.7 G/DL (ref 32–36)
MCV RBC AUTO: 95.5 FL (ref 80–100)
MONOCYTES # BLD AUTO: 161 CELLS/UL (ref 200–950)
MONOCYTES NFR BLD AUTO: 7 %
NEUTROPHILS # BLD AUTO: 1196 CELLS/UL (ref 1500–7800)
NEUTROPHILS NFR BLD AUTO: 52 %
NONHDLC SERPL-MCNC: 136 MG/DL (CALC)
PLATELET # BLD AUTO: 199 THOUSAND/UL (ref 140–400)
PMV BLD REES-ECKER: 12.3 FL (ref 7.5–12.5)
POTASSIUM SERPL-SCNC: 4.6 MMOL/L (ref 3.5–5.3)
PROT SERPL-MCNC: 7 G/DL (ref 6.1–8.1)
RBC # BLD AUTO: 4.44 MILLION/UL (ref 3.8–5.1)
SL AMB EGFR AFRICAN AMERICAN: 81 ML/MIN/1.73M2
SL AMB EGFR NON AFRICAN AMERICAN: 70 ML/MIN/1.73M2
SODIUM SERPL-SCNC: 137 MMOL/L (ref 135–146)
TRIGL SERPL-MCNC: 43 MG/DL
TSH SERPL-ACNC: 2.94 MIU/L (ref 0.4–4.5)
WBC # BLD AUTO: 2.3 THOUSAND/UL (ref 3.8–10.8)

## 2020-06-22 ENCOUNTER — TELEPHONE (OUTPATIENT)
Dept: FAMILY MEDICINE CLINIC | Facility: CLINIC | Age: 65
End: 2020-06-22

## 2020-06-22 DIAGNOSIS — E55.9 VITAMIN D DEFICIENCY: ICD-10-CM

## 2020-06-22 DIAGNOSIS — E78.00 PURE HYPERCHOLESTEROLEMIA: ICD-10-CM

## 2020-06-22 DIAGNOSIS — D72.819 LEUKOPENIA, UNSPECIFIED TYPE: Primary | ICD-10-CM

## 2020-07-07 LAB
25(OH)D3 SERPL-MCNC: 34 NG/ML (ref 30–100)
ALBUMIN SERPL ELPH-MCNC: 4.7 G/DL (ref 3.8–4.8)
ALBUMIN SERPL-MCNC: 4.8 G/DL (ref 3.6–5.1)
ALBUMIN/GLOB SERPL: 1.8 (CALC) (ref 1–2.5)
ALP SERPL-CCNC: 87 U/L (ref 37–153)
ALPHA1 GLOB SERPL ELPH-MCNC: 0.2 G/DL (ref 0.2–0.3)
ALPHA2 GLOB SERPL ELPH-MCNC: 0.6 G/DL (ref 0.5–0.9)
ALT SERPL-CCNC: 18 U/L (ref 6–29)
AST SERPL-CCNC: 25 U/L (ref 10–35)
BASOPHILS # BLD AUTO: 41 CELLS/UL (ref 0–200)
BASOPHILS NFR BLD AUTO: 1.4 %
BETA1 GLOB SERPL ELPH-MCNC: 0.4 G/DL (ref 0.4–0.6)
BETA2 GLOB SERPL ELPH-MCNC: 0.3 G/DL (ref 0.2–0.5)
BILIRUB SERPL-MCNC: 0.5 MG/DL (ref 0.2–1.2)
BUN SERPL-MCNC: 17 MG/DL (ref 7–25)
BUN/CREAT SERPL: NORMAL (CALC) (ref 6–22)
CALCIUM SERPL-MCNC: 9.8 MG/DL (ref 8.6–10.4)
CHLORIDE SERPL-SCNC: 105 MMOL/L (ref 98–110)
CHOLEST SERPL-MCNC: 257 MG/DL
CHOLEST/HDLC SERPL: 3 (CALC)
CO2 SERPL-SCNC: 27 MMOL/L (ref 20–32)
CREAT SERPL-MCNC: 0.78 MG/DL (ref 0.5–0.99)
EOSINOPHIL # BLD AUTO: 32 CELLS/UL (ref 15–500)
EOSINOPHIL NFR BLD AUTO: 1.1 %
ERYTHROCYTE [DISTWIDTH] IN BLOOD BY AUTOMATED COUNT: 12.4 % (ref 11–15)
GAMMA GLOB SERPL ELPH-MCNC: 1.1 G/DL (ref 0.8–1.7)
GLOBULIN SER CALC-MCNC: 2.6 G/DL (CALC) (ref 1.9–3.7)
GLUCOSE SERPL-MCNC: 94 MG/DL (ref 65–99)
HCT VFR BLD AUTO: 41.6 % (ref 35–45)
HDLC SERPL-MCNC: 86 MG/DL
HGB BLD-MCNC: 14.3 G/DL (ref 11.7–15.5)
LDLC SERPL CALC-MCNC: 154 MG/DL (CALC)
LYMPHOCYTES # BLD AUTO: 1027 CELLS/UL (ref 850–3900)
LYMPHOCYTES NFR BLD AUTO: 35.4 %
MCH RBC QN AUTO: 34 PG (ref 27–33)
MCHC RBC AUTO-ENTMCNC: 34.4 G/DL (ref 32–36)
MCV RBC AUTO: 98.8 FL (ref 80–100)
MONOCYTES # BLD AUTO: 215 CELLS/UL (ref 200–950)
MONOCYTES NFR BLD AUTO: 7.4 %
NEUTROPHILS # BLD AUTO: 1586 CELLS/UL (ref 1500–7800)
NEUTROPHILS NFR BLD AUTO: 54.7 %
NONHDLC SERPL-MCNC: 171 MG/DL (CALC)
PLATELET # BLD AUTO: 194 THOUSAND/UL (ref 140–400)
PMV BLD REES-ECKER: 12.8 FL (ref 7.5–12.5)
POTASSIUM SERPL-SCNC: 4.4 MMOL/L (ref 3.5–5.3)
PROT PATTERN SERPL ELPH-IMP: NORMAL
PROT SERPL-MCNC: 7.4 G/DL (ref 6.1–8.1)
PROT SERPL-MCNC: 7.4 G/DL (ref 6.1–8.1)
RBC # BLD AUTO: 4.21 MILLION/UL (ref 3.8–5.1)
SL AMB EGFR AFRICAN AMERICAN: 93 ML/MIN/1.73M2
SL AMB EGFR NON AFRICAN AMERICAN: 80 ML/MIN/1.73M2
SODIUM SERPL-SCNC: 142 MMOL/L (ref 135–146)
TRIGL SERPL-MCNC: 72 MG/DL
VIT B12 SERPL-MCNC: 968 PG/ML (ref 200–1100)
WBC # BLD AUTO: 2.9 THOUSAND/UL (ref 3.8–10.8)

## 2020-07-09 ENCOUNTER — TELEPHONE (OUTPATIENT)
Dept: FAMILY MEDICINE CLINIC | Facility: CLINIC | Age: 65
End: 2020-07-09

## 2020-07-09 NOTE — TELEPHONE ENCOUNTER
Patient called asking if there were updates on lab work from 07-  She would like to know if there is  anything she should have any concerns  Patient will come tomorrow to  a copy of labs  Labs placed in [facility]  She will also drop off a copy of her new insurance card

## 2020-07-09 NOTE — TELEPHONE ENCOUNTER
Call re labs  Cholesterol 257 < 200 normal  Her HDL or good cholesterol is very high at 86 > 40 normal  This is beneficial  FBS normal  Kidney and liver tests normal  Vitamin 34 range 30 to 100  Vitamin B 12 normal  Her white cell count remains low at 2,800 but stable going back to 2016  range 3,800 to 10,800  No additional testing needed at this for low WBC  I would rechecking a CBC in 6 months  Re cholesterol typically no medication would be indicated given high HDL  New 2020 cholesterol guidelines could consider a CT coronary calcium score  A $ 99 test not covered by insurance  Looks for calcifications in the coronary arteries as a marker for heart disease   If calcification present then would consider statin therapy     WBC   Date Value Ref Range Status   10/04/2017 2 8 (L) 3 8 - 10 8 Thousand/uL Final   11/18/2016 3 4 (L) 3 8 - 10 8 Thousand/uL Final   08/16/2016 3 1 (L) 3 8 - 10 8 Thousand/uL Final     White Blood Cell Count   Date Value Ref Range Status   07/06/2020 2 9 (L) 3 8 - 10 8 Thousand/uL Final   08/17/2019 2 3 (L) 3 8 - 10 8 Thousand/uL Final   07/21/2018 2 7 (L) 3 8 - 10 8 Thousand/uL Final         Recent Results (from the past 672 hour(s))   Lipid panel    Collection Time: 07/06/20  7:37 AM   Result Value Ref Range    Total Cholesterol 257 (H) <200 mg/dL    HDL 86 > OR = 50 mg/dL    Triglycerides 72 <150 mg/dL    LDL Calculated 154 (H) mg/dL (calc)    Chol HDLC Ratio 3 0 <5 0 (calc)    Non-HDL Cholesterol 171 (H) <130 mg/dL (calc)   Protein electrophoresis, serum    Collection Time: 07/06/20  7:37 AM   Result Value Ref Range    Protein, Total 7 4 6 1 - 8 1 g/dL    Albumin, Electrophoresis 4 7 3 8 - 4 8 g/dL    Alpha-1 Globulin 0 2 0 2 - 0 3 g/dL    Alpha-2 Globulin 0 6 0 5 - 0 9 g/dL    Beta 1 Globulin 0 4 0 4 - 0 6 g/dL    Beta 2 Globulin 0 3 0 2 - 0 5 g/dL    Gamma Globulin 1 1 0 8 - 1 7 g/dL    Interpretation Normal Electrophoretic Pattern    Comprehensive metabolic panel    Collection Time: 07/06/20  7:37 AM   Result Value Ref Range    Glucose, Random 94 65 - 99 mg/dL    BUN 17 7 - 25 mg/dL    Creatinine 0 78 0 50 - 0 99 mg/dL    eGFR Non  80 > OR = 60 mL/min/1 73m2    eGFR  93 > OR = 60 mL/min/1 73m2    SL AMB BUN/CREATININE RATIO NOT APPLICABLE 6 - 22 (calc)    Sodium 142 135 - 146 mmol/L    Potassium 4 4 3 5 - 5 3 mmol/L    Chloride 105 98 - 110 mmol/L    CO2 27 20 - 32 mmol/L    Calcium 9 8 8 6 - 10 4 mg/dL    Protein, Total 7 4 6 1 - 8 1 g/dL    Albumin 4 8 3 6 - 5 1 g/dL    Globulin 2 6 1 9 - 3 7 g/dL (calc)    Albumin/Globulin Ratio 1 8 1 0 - 2 5 (calc)    TOTAL BILIRUBIN 0 5 0 2 - 1 2 mg/dL    Alkaline Phosphatase 87 37 - 153 U/L    AST 25 10 - 35 U/L    ALT 18 6 - 29 U/L   CBC and differential    Collection Time: 07/06/20  7:37 AM   Result Value Ref Range    White Blood Cell Count 2 9 (L) 3 8 - 10 8 Thousand/uL    Red Blood Cell Count 4 21 3 80 - 5 10 Million/uL    Hemoglobin 14 3 11 7 - 15 5 g/dL    HCT 41 6 35 0 - 45 0 %    MCV 98 8 80 0 - 100 0 fL    MCH 34 0 (H) 27 0 - 33 0 pg    MCHC 34 4 32 0 - 36 0 g/dL    RDW 12 4 11 0 - 15 0 %    Platelet Count 795 757 - 400 Thousand/uL    SL AMB MPV 12 8 (H) 7 5 - 12 5 fL    Neutrophils (Absolute) 1,586 1,500 - 7,800 cells/uL    Lymphocytes (Absolute) 1,027 850 - 3,900 cells/uL    Monocytes (Absolute) 215 200 - 950 cells/uL    Eosinophils (Absolute) 32 15 - 500 cells/uL    Basophils ABS 41 0 - 200 cells/uL    Neutrophils 54 7 %    Lymphocytes 35 4 %    Monocytes 7 4 %    Eosinophils 1 1 %    Basophils PCT 1 4 %   Vitamin B12    Collection Time: 07/06/20  7:37 AM   Result Value Ref Range    Vitamin B-12 968 200 - 1,100 pg/mL   Vitamin D 25 hydroxy    Collection Time: 07/06/20  7:37 AM   Result Value Ref Range    Vitamin D, 25-Hydroxy, Serum 34 30 - 100 ng/mL

## 2024-01-25 ENCOUNTER — VBI (OUTPATIENT)
Dept: ADMINISTRATIVE | Facility: OTHER | Age: 69
End: 2024-01-25

## 2024-07-02 ENCOUNTER — OFFICE VISIT (OUTPATIENT)
Dept: FAMILY MEDICINE CLINIC | Facility: CLINIC | Age: 69
End: 2024-07-02
Payer: COMMERCIAL

## 2024-07-02 VITALS
TEMPERATURE: 98 F | RESPIRATION RATE: 16 BRPM | HEART RATE: 60 BPM | OXYGEN SATURATION: 100 % | DIASTOLIC BLOOD PRESSURE: 72 MMHG | HEIGHT: 68 IN | SYSTOLIC BLOOD PRESSURE: 114 MMHG | BODY MASS INDEX: 17.2 KG/M2 | WEIGHT: 113.5 LBS

## 2024-07-02 DIAGNOSIS — E55.9 VITAMIN D DEFICIENCY: ICD-10-CM

## 2024-07-02 DIAGNOSIS — Z00.00 ANNUAL PHYSICAL EXAM: Primary | ICD-10-CM

## 2024-07-02 DIAGNOSIS — E78.5 HYPERLIPIDEMIA, UNSPECIFIED HYPERLIPIDEMIA TYPE: ICD-10-CM

## 2024-07-02 DIAGNOSIS — D70.9 NEUTROPENIA, UNSPECIFIED TYPE (HCC): ICD-10-CM

## 2024-07-02 DIAGNOSIS — M81.0 AGE-RELATED OSTEOPOROSIS WITHOUT CURRENT PATHOLOGICAL FRACTURE: ICD-10-CM

## 2024-07-02 PROCEDURE — 99387 INIT PM E/M NEW PAT 65+ YRS: CPT | Performed by: FAMILY MEDICINE

## 2024-07-02 NOTE — PROGRESS NOTES
Adult Annual Physical  Name: Stella Cheng      : 1955      MRN: [id number]  Encounter Provider: Frankie Rsut MD  Encounter Date: 2024   Encounter department: [facility]    Assessment & Plan   1. Annual physical exam  2. Age-related osteoporosis without current pathological fracture  3. Neutropenia, unspecified type (HCC)  -     CBC and Platelet; Future  -     Comprehensive metabolic panel; Future  4. Vitamin D deficiency  -     Vitamin D 25 hydroxy; Future  5. Hyperlipidemia, unspecified hyperlipidemia type  -     Lipid panel; Future    Mammo and colonoscopy   Mammogram- 2025. Completed .   Colonoscopy -appointment with gastroenterology 2024 due for repeat colonoscopy.  Dexa scan completed at [facility] in [place]- Refused medication.         Immunizations and preventive care screenings were discussed with patient today. Appropriate education was printed on patient's after visit summary.    Counseling:  Alcohol/drug use: discussed moderation in alcohol intake, the recommendations for healthy alcohol use, and avoidance of illicit drug use.  Dental Health: discussed importance of regular tooth brushing, flossing, and dental visits.  Injury prevention: discussed safety/seat belts, safety helmets, smoke detectors, carbon dioxide detectors, and smoking near bedding or upholstery.  Sexual health: discussed sexually transmitted diseases, partner selection, use of condoms, avoidance of unintended pregnancy, and contraceptive alternatives.  Exercise: the importance of regular exercise/physical activity was discussed. Recommend exercise 3-5 times per week for at least 30 minutes.          History of Present Illness     Adult Annual Physical:  Patient presents for annual physical.     Diet and Physical Activity:  - Diet/Nutrition: well balanced diet.  - Exercise: walking and 5-7 times a week on average. 5 miles daily    Depression Screening:  - PHQ-2 Score:  "0    General Health:  - Sleep: sleeps well.  - Hearing: normal hearing bilateral ears.  - Vision: no vision problems.  - Dental: regular dental visits.    /GYN Health:  - Follows with GYN: yes.     Review of Systems   Constitutional:  Negative for activity change, fatigue and fever.   Eyes:  Negative for visual disturbance.   Respiratory:  Negative for shortness of breath.    Cardiovascular:  Negative for chest pain.   Gastrointestinal:  Negative for abdominal pain, constipation, diarrhea and nausea.   Endocrine: Negative for cold intolerance and heat intolerance.   Musculoskeletal:  Negative for back pain.   Skin:  Negative for rash.   Neurological:  Negative for headaches.   Psychiatric/Behavioral:  Negative for confusion.      Medical History Reviewed by provider this encounter:  Tobacco  Allergies  Meds  Problems  Med Hx  Surg Hx  Fam Hx       Current Outpatient Medications on File Prior to Visit   Medication Sig Dispense Refill    Cholecalciferol (VITAMIN D3 PO)       Cyanocobalamin (VITAMIN B-12) 1000 MCG SUBL Place 1 tablet under the tongue daily      Folic Acid (FOLATE PO) 666 mcg      MAGNESIUM PO       [DISCONTINUED] FOLIC ACID PO Take 1 tablet by mouth daily       No current facility-administered medications on file prior to visit.      Social History     Tobacco Use    Smoking status: Never    Smokeless tobacco: Never   Vaping Use    Vaping status: Never Used   Substance and Sexual Activity    Alcohol use: Yes     Alcohol/week: 2.0 standard drinks of alcohol     Types: 2 Glasses of wine per week     Comment: socuial    Drug use: No    Sexual activity: Not Currently       Objective     /72 (BP Location: Left arm, Patient Position: Sitting, Cuff Size: Standard)   Pulse 60   Temp 98 °F (36.7 °C) (Temporal)   Resp 16   Ht 5' 7.5\" (1.715 m)   Wt 51.5 kg (113 lb 8 oz)   SpO2 100%   BMI 17.51 kg/m²     Physical Exam  Vitals and nursing note reviewed.   Constitutional:       Appearance: " Normal appearance. She is well-developed.   HENT:      Head: Normocephalic and atraumatic.   Eyes:      Extraocular Movements: Extraocular movements intact.      Pupils: Pupils are equal, round, and reactive to light.   Cardiovascular:      Rate and Rhythm: Normal rate and regular rhythm.   Pulmonary:      Effort: Pulmonary effort is normal.      Breath sounds: Normal breath sounds.   Abdominal:      General: Bowel sounds are normal.      Palpations: Abdomen is soft.   Musculoskeletal:      Cervical back: Normal range of motion.   Skin:     General: Skin is warm and dry.   Neurological:      General: No focal deficit present.      Mental Status: She is alert and oriented to person, place, and time.   Psychiatric:         Mood and Affect: Mood normal.         Speech: Speech normal.         Behavior: Behavior normal.       Administrative Statements

## 2024-07-15 NOTE — ASSESSMENT & PLAN NOTE
Last DEXA scan 1/6/2022 through [facility].  Reviewed previous note from endocrinologist dated 1/31/2022.osteoporosis with tscore -4.1 spine z score -2 refused medications at that time.

## 2024-07-16 ENCOUNTER — TELEPHONE (OUTPATIENT)
Dept: FAMILY MEDICINE CLINIC | Facility: CLINIC | Age: 69
End: 2024-07-16

## 2024-07-16 LAB
25(OH)D3+25(OH)D2 SERPL-MCNC: 70.1 NG/ML (ref 30–100)
ALBUMIN SERPL-MCNC: 4.5 G/DL (ref 3.9–4.9)
ALP SERPL-CCNC: 97 IU/L (ref 44–121)
ALT SERPL-CCNC: 17 IU/L (ref 0–32)
AST SERPL-CCNC: 23 IU/L (ref 0–40)
BASOPHILS # BLD AUTO: 0 X10E3/UL (ref 0–0.2)
BASOPHILS NFR BLD AUTO: 1 %
BILIRUB SERPL-MCNC: 0.5 MG/DL (ref 0–1.2)
BUN SERPL-MCNC: 11 MG/DL (ref 8–27)
BUN/CREAT SERPL: 14 (ref 12–28)
CALCIUM SERPL-MCNC: 9.5 MG/DL (ref 8.7–10.3)
CHLORIDE SERPL-SCNC: 103 MMOL/L (ref 96–106)
CHOLEST SERPL-MCNC: 230 MG/DL (ref 100–199)
CO2 SERPL-SCNC: 26 MMOL/L (ref 20–29)
CREAT SERPL-MCNC: 0.8 MG/DL (ref 0.57–1)
EGFR: 80 ML/MIN/1.73
EOSINOPHIL # BLD AUTO: 0 X10E3/UL (ref 0–0.4)
EOSINOPHIL NFR BLD AUTO: 1 %
ERYTHROCYTE [DISTWIDTH] IN BLOOD BY AUTOMATED COUNT: 13.3 % (ref 11.7–15.4)
GLOBULIN SER-MCNC: 2.5 G/DL (ref 1.5–4.5)
GLUCOSE SERPL-MCNC: 98 MG/DL (ref 70–99)
HCT VFR BLD AUTO: 41.8 % (ref 34–46.6)
HDLC SERPL-MCNC: 87 MG/DL
HGB BLD-MCNC: 13.7 G/DL (ref 11.1–15.9)
IMM GRANULOCYTES # BLD: 0 X10E3/UL (ref 0–0.1)
IMM GRANULOCYTES NFR BLD: 0 %
LDLC SERPL CALC-MCNC: 133 MG/DL (ref 0–99)
LYMPHOCYTES # BLD AUTO: 0.9 X10E3/UL (ref 0.7–3.1)
LYMPHOCYTES NFR BLD AUTO: 37 %
MCH RBC QN AUTO: 32.5 PG (ref 26.6–33)
MCHC RBC AUTO-ENTMCNC: 32.8 G/DL (ref 31.5–35.7)
MCV RBC AUTO: 99 FL (ref 79–97)
MONOCYTES # BLD AUTO: 0.2 X10E3/UL (ref 0.1–0.9)
MONOCYTES NFR BLD AUTO: 9 %
NEUTROPHILS # BLD AUTO: 1.3 X10E3/UL (ref 1.4–7)
NEUTROPHILS NFR BLD AUTO: 52 %
PLATELET # BLD AUTO: 192 X10E3/UL (ref 150–450)
POTASSIUM SERPL-SCNC: 4.9 MMOL/L (ref 3.5–5.2)
PROT SERPL-MCNC: 7 G/DL (ref 6–8.5)
RBC # BLD AUTO: 4.22 X10E6/UL (ref 3.77–5.28)
SL AMB VLDL CHOLESTEROL CALC: 10 MG/DL (ref 5–40)
SODIUM SERPL-SCNC: 139 MMOL/L (ref 134–144)
TRIGL SERPL-MCNC: 59 MG/DL (ref 0–149)
WBC # BLD AUTO: 2.4 X10E3/UL (ref 3.4–10.8)

## 2024-07-17 ENCOUNTER — TELEPHONE (OUTPATIENT)
Dept: FAMILY MEDICINE CLINIC | Facility: CLINIC | Age: 69
End: 2024-07-17

## 2024-07-17 NOTE — TELEPHONE ENCOUNTER
----- Message from Frankie Rust MD sent at 7/15/2024 12:37 PM EDT -----  Regarding: Care gaps  Patient completed DEXA scan 1/6/2022 at [facility].  Mammogram completed 1/25/2024.  Please have care gaps reach out to [facility] in [place] to close these care gaps

## 2024-07-18 ENCOUNTER — TELEPHONE (OUTPATIENT)
Dept: ADMINISTRATIVE | Facility: OTHER | Age: 69
End: 2024-07-18

## 2024-07-18 NOTE — TELEPHONE ENCOUNTER
Upon review of the In Basket request and the patient's chart, initial outreach has been made via fax to facility. Please see Contacts section for details.     Faxed to Jonathan MISHRA and Alexey MISHRA office    Thank you  Madisyn Hansen MA

## 2024-07-18 NOTE — TELEPHONE ENCOUNTER
----- Message from Rin ALLEN sent at 7/17/2024 11:48 AM EDT -----  Regarding: Care Gap Request  07/17/24 11:48 AM    Hello, our patient above has had Mammogram completed/performed. Please assist in updating the patient chart by making an External outreach to [facility] located in [place]. The date of service is 01/25/2024.    Thank you,  Rin BACA

## 2024-07-18 NOTE — LETTER
Procedure Request Form: Mammogram      Date Requested: 24  Patient: Stella Cheng  Patient : 1955   Referring Provider: Frankie Rust MD        Date of Procedure ______________________________       The above patient has informed us that they have completed their   most recent Mammogram at your facility. Please complete   this form and attach all corresponding procedure reports/results.    Comments __________________________________________________________  ____________________________________________________________________  ____________________________________________________________________  ____________________________________________________________________    Facility Completing Procedure _________________________________________    Form Completed By (print name) _______________________________________      Signature __________________________________________________________      These reports are needed for  compliance.    Please fax this completed form and a copy of the procedure report to our office located at [address] [place] [address] as soon as possible to Fax [phone] attention Madisyn: Phone [phone]    We thank you for your assistance in treating our mutual patient.

## 2024-07-18 NOTE — LETTER
Procedure Request Form: Mammogram      Date Requested: 24  Patient: Stella Cheng  Patient : 1955   Referring Provider: Frankie Rust MD        Date of Procedure ______________________________       The above patient has informed us that they have completed their   most recent Mammogram at your facility. Please complete   this form and attach all corresponding procedure reports/results.    Comments __________________________________________________________  ____________________________________________________________________  ____________________________________________________________________  ____________________________________________________________________    Facility Completing Procedure _________________________________________    Form Completed By (print name) _______________________________________      Signature __________________________________________________________      These reports are needed for  compliance.    Please fax this completed form and a copy of the procedure report to our office located at [address] [place] [address] as soon as possible to Fax [phone] attention Madisyn: Phone [phone]    We thank you for your assistance in treating our mutual patient.

## 2024-08-06 NOTE — TELEPHONE ENCOUNTER
Upon review of the In Basket request we were able to locate, review, and update the patient chart as requested for Mammogram.    Any additional questions or concerns should be emailed to the Practice Liaisons via the appropriate education email address, please do not reply via In Basket.    Thank you  Madisyn Hansen MA   [facility]

## 2024-12-06 ENCOUNTER — TELEPHONE (OUTPATIENT)
Dept: ADMINISTRATIVE | Facility: OTHER | Age: 69
End: 2024-12-06

## 2024-12-06 NOTE — TELEPHONE ENCOUNTER
----- Message from Frankie Rust MD sent at 12/5/2024 12:12 PM EST -----  Regarding: Colon cancer screening  Patient colon cancer screening completed in [place].  Information in patient's chart.  Please close care gap

## 2024-12-06 NOTE — TELEPHONE ENCOUNTER
Upon review of the In Basket request we were able to locate, review, and update the patient chart as requested for CRC: Colonoscopy.    Any additional questions or concerns should be emailed to the Practice Liaisons via the appropriate education email address, please do not reply via In Basket.    Thank you  Dallas Marion MA   [facility]

## 2025-07-28 ENCOUNTER — OFFICE VISIT (OUTPATIENT)
Dept: FAMILY MEDICINE CLINIC | Facility: CLINIC | Age: 70
End: 2025-07-28
Payer: MEDICARE

## 2025-07-28 VITALS
WEIGHT: 115 LBS | DIASTOLIC BLOOD PRESSURE: 62 MMHG | SYSTOLIC BLOOD PRESSURE: 110 MMHG | BODY MASS INDEX: 18.05 KG/M2 | RESPIRATION RATE: 18 BRPM | OXYGEN SATURATION: 99 % | TEMPERATURE: 97.6 F | HEART RATE: 69 BPM | HEIGHT: 67 IN

## 2025-07-28 DIAGNOSIS — D70.9 NEUTROPENIA, UNSPECIFIED TYPE (HCC): ICD-10-CM

## 2025-07-28 DIAGNOSIS — E78.00 PURE HYPERCHOLESTEROLEMIA: Primary | ICD-10-CM

## 2025-07-28 DIAGNOSIS — Z00.00 MEDICARE ANNUAL WELLNESS VISIT, SUBSEQUENT: ICD-10-CM

## 2025-07-28 DIAGNOSIS — E55.9 VITAMIN D DEFICIENCY: ICD-10-CM

## 2025-07-28 DIAGNOSIS — M81.0 AGE-RELATED OSTEOPOROSIS WITHOUT CURRENT PATHOLOGICAL FRACTURE: ICD-10-CM

## 2025-07-28 PROCEDURE — 99214 OFFICE O/P EST MOD 30 MIN: CPT | Performed by: FAMILY MEDICINE

## 2025-07-28 PROCEDURE — G0439 PPPS, SUBSEQ VISIT: HCPCS | Performed by: FAMILY MEDICINE
